# Patient Record
Sex: MALE | Race: WHITE
[De-identification: names, ages, dates, MRNs, and addresses within clinical notes are randomized per-mention and may not be internally consistent; named-entity substitution may affect disease eponyms.]

---

## 2017-03-08 NOTE — PHYS DOC
Past Medical History


Past Medical History:  Depression, Other


Additional Past Medical Histor:  SI


Past Surgical History:  No Surgical History


Alcohol Use:  Rarely


Drug Use:  Marijuana





Adult General


Chief Complaint


Chief Complaint:  TESTICULAR PAIN OR INJURY





HPI


HPI


27-year-old male presenting to the emergency department today after attempting 

castrated himself by placing a rubber band around the top part of his scrotal 

sac and testicular pedicle. He placed at approximately 2-3 hours prior to 

arrival. He did it because he wanted to stop the testosterone and wanted us to 

exchange. He has pain in his testicles that is sharp moderate to severe 

nonradiating and not alleviating factors.





Review of systems is negative for abdominal pain nausea vomiting diarrhea. All 

other review of systems is negative unless otherwise noted in history of 

present illness.





Review of Systems


Review of Systems


see above





Allergies


Allergies





 Allergies








Coded Allergies Type Severity Reaction Last Updated Verified


 


  No Known Drug Allergies    10/14/15 No











Physical Exam


Physical Exam








Constitutional: Well developed, well nourished, no acute distress, non-toxic 

appearance. 


HENT: Normocephalic, atraumatic, bilateral external ears normal, oropharynx 

moist, no oral exudates, nose normal. []


Eyes: PERRLA, EOMI, conjunctiva normal, no discharge. [] 


Neck: Normal range of motion, no tenderness, supple, no stridor.  


Cardiovascular:Heart rate regular rhythm, no murmur []


Lungs & Thorax:  Bilateral breath sounds clear to auscultation 


Abdomen: Bowel sounds normal, soft, no tenderness, no masses, no pulsatile 

masses. [] 


: The patient's testicles on initial evaluation are normal in size and mildly 

dusky in color. There is a tight green band at the most proximal aspect of the 

patient's scrotum. This was removed immediately upon the patient's arrival.


Skin: Warm, dry, no erythema, no rash. [] 


Back: No tenderness, no CVA tenderness.  


Extremities: No tenderness, no cyanosis, no clubbing, ROM intact, no edema. [] 


Neurologic: Alert and oriented X 3, normal motor function, normal sensory 

function, no focal deficits noted. 


Psychologic: 





Psych:


 Appearance:      normal


 M/S:         Alert and oriented


 Mood/Affect:      Normal


 Speech:      Normal


 Insight:      poor   


 Hallucinations:   None   


 SI or HI:      Patient denies suicidal or homicidal ideation however has 

demonstrated very significant self-harm attempt.





Current Patient Data


Vital Signs





 Vital Signs








  Date Time  Temp Pulse Resp B/P Pulse Ox O2 Delivery O2 Flow Rate FiO2


 


3/8/17 04:55  59  105/51 95 Room Air  


 


3/8/17 03:30 97.7  24     





 97.7       








Lab Values





 Laboratory Tests








Test


  3/8/17


03:34


 


White Blood Count


  14.9x10^3/uL


(4.0-11.0)  H


 


Red Blood Count


  4.76x10^6/uL


(4.30-5.70)


 


Hemoglobin


  13.9g/dL


(13.0-17.5)


 


Hematocrit


  42.2%


(39.0-53.0)


 


Mean Corpuscular Volume 89fL ()  


 


Mean Corpuscular Hemoglobin 29pg (25-35)  


 


Mean Corpuscular Hemoglobin


Concent 33g/dL (31-37)


 


 


Red Cell Distribution Width


  14.5%


(11.5-14.5)


 


Platelet Count


  250x10^3/uL


(140-400)


 


Neutrophils (%) (Auto) 71% (31-73)  


 


Lymphocytes (%) (Auto) 16% (24-48)  L


 


Monocytes (%) (Auto) 5% (0-9)  


 


Eosinophils (%) (Auto) 8% (0-3)  H


 


Basophils (%) (Auto) 1% (0-3)  


 


Neutrophils # (Auto)


  10.6x10^3uL


(1.8-7.7)  H


 


Lymphocytes # (Auto)


  2.3x10^3/uL


(1.0-4.8)


 


Monocytes # (Auto)


  0.7x10^3/uL


(0.0-1.1)


 


Eosinophils # (Auto)


  1.1x10^3/uL


(0.0-0.7)  H


 


Basophils # (Auto)


  0.1x10^3/uL


(0.0-0.2)


 


Sodium Level


  145mmol/L


(136-145)


 


Potassium Level


  3.2mmol/L


(3.5-5.1)  L


 


Chloride Level


  105mmol/L


()


 


Carbon Dioxide Level


  23mmol/L


(21-32)


 


Anion Gap 17 (6-14)  H


 


Blood Urea Nitrogen


  18mg/dL (8-26)


 


 


Creatinine


  1.2mg/dL


(0.7-1.3)


 


Estimated GFR


(Cockcroft-Gault) 72.6  


 


 


Glucose Level


  199mg/dL


(70-99)  H


 


Calcium Level


  8.9mg/dL


(8.5-10.1)





 Laboratory Tests


3/8/17 03:34








 Laboratory Tests


3/8/17 03:34














EKG


EKG


[]





Radiology/Procedures


Radiology/Procedures


[]





Course & Med Decision Making


Course & Med Decision Making


Pertinent Labs and Imaging studies reviewed. (See chart for details)





[][] 25-year-old male presenting to the emergency department after attempting 

castrate himself. Vital signs afebrile. Mild hypertension. Pertinent physical 

exam findings showed significant pressure at the most proximal aspect of the 

scrotal sac by green rubber band. This was cut immediately upon the patient's 

arrival. Immediately after this the patient's testicles color changed. The 

scrotal sac returned to normal color. CBC obtained which showed leukocytosis 

with chemistry panel otherwise showed mild hypokalemia. because of the patient'

s attempt of self-harm, our psychiatric assessment team evaluated the patient. 

Ultrasound of the testicles was obtained which showed blood flow to the 

testicles. Clinically the patient's testicles were not torsed. They were in 

normal orientation. Cremasteric reflex intact. Psychiatric team came up with a 

safety plan and close follow-up with sexual orientation support group in the 

city. The patient denied suicidal or homicidal ideation. Family comfortable 

with plan. Patient discharged home to follow up with support group. He was to 

return to the emergency department if he developed suicidal or homicidal 

ideation.





Dragon Disclaimer


Dragon Disclaimer


This electronic medical record was generated, in whole or in part, using a 

voice recognition dictation system.





Departure


Departure


Impression:  


 Primary Impression:  


 Testicular pain


 Additional Impression:  


 Self-harm


Disposition:  01 HOME, SELF-CARE


Condition:  STABLE


Referrals:  


NO PCP (PCP)





Additional Instructions:


Thank you for allowing us to participate in your care today.





Return to the emergency department if you develop homicidal or suicidal 

ideation (thoughts of hurting yourself or others).





Followup with your primary care physician in 3 days if your symptoms do not 

improve. If you do not have a primary care provider you can ask for a list of 

our primary care providers. Return to the emergency department you have any new 

or concerning findings.





This should be evaluated by the primary care physician and any necessary 

consulting services for continued management within a few days after discharge. 

Return to emergency room if you have any  new or concerning symptoms including 

but not limited to fever, chills, nausea, vomiting, intractable pain, any new 

rashes, chest pain, shortness of air, uncontrolled bleeding, difficulty 

breathing, and/or vision loss.





You may have been prescribed medication that can change in your level of 

thinking and ability to operate machinery. These medications include 

hydrocodone and Ativan. Also, Benadryl has been known to do this as well. Be 

sure to check with your pharmacist and ask if the medications you've prescribed 

can affect your level of consciousness. I recommend not operating heavy 

machinery or driving while on medication such as these.





Problem Qualifiers








GABINO BABB MD Mar 8, 2017 04:36

## 2017-03-08 NOTE — RAD
PROCEDURE 

Testicular ultrasound dated 03/08/2017. 

 

HISTORY 

Pain after injury. Patient wrapped band around testicles 

 

TECHNIQUE 

Routine sonographic imaging performed. 

 

COMPARISON 

 

 

FINDINGS 

Right testicle measures 5.3 x 3.0 x 2.3 centimeter. Left testicle measures

4.9 x 2.7 x 2.4 centimeter. Normal color Doppler flow to both testicles. 

There is a geographic zone of hypo echogenicity at the medial margin of 

the mid to inferior right testicle. 

Epididymis are unremarkable. No significant hydrocele or varicocele. No 

significant scrotal wall thickening. 

 

IMPRESSION 

Geographic zone of hypo echogenicity within the right testicle is 

indeterminate but given the clinical history may represent a zone of 

testicular infarction. Developing mass or inflammatory process considered 

less likely. Suggest follow up imaging in 2-3 months to ensure 

stability/resolution. 

 

Electronically signed by: Siva Morales (Mar 08, 2017 05:08:25)

## 2019-06-03 ENCOUNTER — HOSPITAL ENCOUNTER (EMERGENCY)
Dept: HOSPITAL 61 - ER | Age: 29
Discharge: HOME | End: 2019-06-03
Payer: SELF-PAY

## 2019-06-03 VITALS — WEIGHT: 160 LBS | BODY MASS INDEX: 21.67 KG/M2 | HEIGHT: 72 IN

## 2019-06-03 VITALS — DIASTOLIC BLOOD PRESSURE: 81 MMHG | SYSTOLIC BLOOD PRESSURE: 145 MMHG

## 2019-06-03 DIAGNOSIS — Z11.4: ICD-10-CM

## 2019-06-03 DIAGNOSIS — F41.9: ICD-10-CM

## 2019-06-03 DIAGNOSIS — R45.851: ICD-10-CM

## 2019-06-03 DIAGNOSIS — F12.20: Primary | ICD-10-CM

## 2019-06-03 DIAGNOSIS — F32.9: ICD-10-CM

## 2019-06-03 LAB
ALBUMIN SERPL-MCNC: 3.9 G/DL (ref 3.4–5)
ALP SERPL-CCNC: 70 U/L (ref 46–116)
ALT SERPL-CCNC: 26 U/L (ref 16–63)
AMPHETAMINE/METHAMPHETAMINE: (no result)
ANION GAP SERPL CALC-SCNC: 11 MMOL/L (ref 6–14)
AST SERPL-CCNC: 18 U/L (ref 15–37)
BARBITURATES UR-MCNC: (no result) UG/ML
BASOPHILS # BLD AUTO: 0 X10^3/UL (ref 0–0.2)
BASOPHILS NFR BLD: 1 % (ref 0–3)
BENZODIAZ UR-MCNC: (no result) UG/L
BILIRUB DIRECT SERPL-MCNC: 0.1 MG/DL (ref 0–0.2)
BILIRUB SERPL-MCNC: 0.3 MG/DL (ref 0.2–1)
BUN SERPL-MCNC: 13 MG/DL (ref 8–26)
CALCIUM SERPL-MCNC: 8.7 MG/DL (ref 8.5–10.1)
CANNABINOIDS UR-MCNC: (no result) UG/L
CHLORIDE SERPL-SCNC: 106 MMOL/L (ref 98–107)
CO2 SERPL-SCNC: 25 MMOL/L (ref 21–32)
COCAINE UR-MCNC: (no result) NG/ML
CREAT SERPL-MCNC: 0.9 MG/DL (ref 0.7–1.3)
EOSINOPHIL NFR BLD: 0.8 X10^3/UL (ref 0–0.7)
EOSINOPHIL NFR BLD: 12 % (ref 0–3)
ERYTHROCYTE [DISTWIDTH] IN BLOOD BY AUTOMATED COUNT: 15.4 % (ref 11.5–14.5)
GFR SERPLBLD BASED ON 1.73 SQ M-ARVRAT: 99.8 ML/MIN
GLUCOSE SERPL-MCNC: 90 MG/DL (ref 70–99)
HCT VFR BLD CALC: 46.4 % (ref 39–53)
HGB BLD-MCNC: 15.7 G/DL (ref 13–17.5)
LYMPHOCYTES # BLD: 1.3 X10^3/UL (ref 1–4.8)
LYMPHOCYTES NFR BLD AUTO: 20 % (ref 24–48)
MAGNESIUM SERPL-MCNC: 2 MG/DL (ref 1.8–2.4)
MCH RBC QN AUTO: 30 PG (ref 25–35)
MCHC RBC AUTO-ENTMCNC: 34 G/DL (ref 31–37)
MCV RBC AUTO: 90 FL (ref 79–100)
METHADONE SERPL-MCNC: (no result) NG/ML
MONO #: 0.5 X10^3/UL (ref 0–1.1)
MONOCYTES NFR BLD: 8 % (ref 0–9)
NEUT #: 3.8 X10^3UL (ref 1.8–7.7)
NEUTROPHILS NFR BLD AUTO: 59 % (ref 31–73)
OPIATES UR-MCNC: (no result) NG/ML
PCP SERPL-MCNC: (no result) MG/DL
PLATELET # BLD AUTO: 178 X10^3/UL (ref 140–400)
POTASSIUM SERPL-SCNC: 4.3 MMOL/L (ref 3.5–5.1)
PROT SERPL-MCNC: 7.1 G/DL (ref 6.4–8.2)
RBC # BLD AUTO: 5.16 X10^6/UL (ref 4.3–5.7)
SODIUM SERPL-SCNC: 142 MMOL/L (ref 136–145)
WBC # BLD AUTO: 6.4 X10^3/UL (ref 4–11)

## 2019-06-03 PROCEDURE — 86703 HIV-1/HIV-2 1 RESULT ANTBDY: CPT

## 2019-06-03 PROCEDURE — 80307 DRUG TEST PRSMV CHEM ANLYZR: CPT

## 2019-06-03 PROCEDURE — 85025 COMPLETE CBC W/AUTO DIFF WBC: CPT

## 2019-06-03 PROCEDURE — 80048 BASIC METABOLIC PNL TOTAL CA: CPT

## 2019-06-03 PROCEDURE — 99284 EMERGENCY DEPT VISIT MOD MDM: CPT

## 2019-06-03 PROCEDURE — 36415 COLL VENOUS BLD VENIPUNCTURE: CPT

## 2019-06-03 PROCEDURE — 83735 ASSAY OF MAGNESIUM: CPT

## 2019-06-03 PROCEDURE — 80076 HEPATIC FUNCTION PANEL: CPT

## 2019-06-03 NOTE — PHYS DOC
Past Medical History


Past Medical History:  Depression, HIV, Other


Additional Past Medical Histor:  SI


Past Surgical History:  No Surgical History


Alcohol Use:  None


Drug Use:  Marijuana





Adult General


Chief Complaint


Chief Complaint:  MEDICAL CLEARANCE





[facility]


HPI





Patient is a 29  year old male brought in by EMS with requesting work up and 

treatment for HIV. Patient rates he was diagnosed with HIV maybe 5 years ago in 

some clinic in [place] and does not take any medication because of lack of 

insurance and wants to take her of his head and wants treatment for his HIV. 

Patient denies any symptoms. When the triage nurse asked her about suicidal he 

states he had suicidal ideation all of his life but he is not going to kill 

himself right now. Patient told me that he has ideal to hang himself for his s

uicidal ideation and had suicidal attempt long time ago. Patient had mental 

hospitalization but he doesn't know how long ago it was happened. Patient denies

homicidal ideation and hallucination. Patient denies taking any medication. 

Patient stays with his mother. Patient admits to use marijuana and smoking 

cigarettes and denies using alcohol and other drugs.





Review of Systems


Review of Systems





Constitutional: Denies fever or chills []


Eyes: Denies change in visual acuity, redness, or eye pain []


HENT: Denies nasal congestion or sore throat []


Respiratory: Denies cough or shortness of breath []


Cardiovascular: No additional information not addressed in HPI []


GI: Denies abdominal pain, nausea, vomiting, bloody stools or diarrhea []


: Denies dysuria or hematuria []


Musculoskeletal: Denies back pain or joint pain []


Integument: Denies rash or skin lesions []


Neurologic: Denies headache, focal weakness or sensory changes []


Endocrine: Denies polyuria or polydipsia []





All other systems were reviewed and found to be within normal limits, except as 

documented in this note.





Allergies


Allergies





Allergies








Coded Allergies Type Severity Reaction Last Updated Verified


 


  No Known Drug Allergies    10/14/15 No











Physical Exam


Physical Exam





Constitutional: Well nourished, mild distress, non-toxic appearance. []


HENT: Normocephalic, atraumatic


Eyes: PERRLA, EOMI, conjunctiva normal, no discharge. [] 


Neck: Normal range of motion, no tenderness, supple, no stridor. [] 


Cardiovascular:Heart rate regular rhythm, no murmur []


Lungs & Thorax:  Bilateral breath sounds clear to auscultation []


Abdomen: Bowel sounds normal, soft, no tenderness, no masses, no pulsatile 

masses. [] 


Skin: Warm, dry, no erythema, no rash. [] 


Back: No tenderness, no CVA tenderness. [] 


Extremities: No tenderness, no cyanosis, no clubbing, ROM intact, no edema. [] 


Neurologic: Alert and oriented X 3, normal motor function, normal sensory 

function, no focal deficits noted. []


Psychologic: Affect anxious , suicidal thoughts.





Current Patient Data


Vital Signs





                                   Vital Signs








  Date Time  Temp Pulse Resp B/P (MAP) Pulse Ox O2 Delivery O2 Flow Rate FiO2


 


6/3/19 11:40  84 26 145/81 (102) 100 Room Air  


 


6/3/19 09:20 97.7       





 97.7       








Lab Values





                                Laboratory Tests








Test


 6/3/19


10:00 6/3/19


11:50


 


White Blood Count


 6.4 x10^3/uL


(4.0-11.0) 





 


Red Blood Count


 5.16 x10^6/uL


(4.30-5.70) 





 


Hemoglobin


 15.7 g/dL


(13.0-17.5) 





 


Hematocrit


 46.4 %


(39.0-53.0) 





 


Mean Corpuscular Volume


 90 fL ()


 





 


Mean Corpuscular Hemoglobin 30 pg (25-35)   


 


Mean Corpuscular Hemoglobin


Concent 34 g/dL


(31-37) 





 


Red Cell Distribution Width


 15.4 %


(11.5-14.5)  H 





 


Platelet Count


 178 x10^3/uL


(140-400) 





 


Neutrophils (%) (Auto) 59 % (31-73)   


 


Lymphocytes (%) (Auto) 20 % (24-48)  L 


 


Monocytes (%) (Auto) 8 % (0-9)   


 


Eosinophils (%) (Auto) 12 % (0-3)  H 


 


Basophils (%) (Auto) 1 % (0-3)   


 


Neutrophils # (Auto)


 3.8 x10^3uL


(1.8-7.7) 





 


Lymphocytes # (Auto)


 1.3 x10^3/uL


(1.0-4.8) 





 


Monocytes # (Auto)


 0.5 x10^3/uL


(0.0-1.1) 





 


Eosinophils # (Auto)


 0.8 x10^3/uL


(0.0-0.7)  H 





 


Basophils # (Auto)


 0.0 x10^3/uL


(0.0-0.2) 





 


Sodium Level


 142 mmol/L


(136-145) 





 


Potassium Level


 4.3 mmol/L


(3.5-5.1) 





 


Chloride Level


 106 mmol/L


() 





 


Carbon Dioxide Level


 25 mmol/L


(21-32) 





 


Anion Gap 11 (6-14)   


 


Blood Urea Nitrogen


 13 mg/dL


(8-26) 





 


Creatinine


 0.9 mg/dL


(0.7-1.3) 





 


Estimated GFR


(Cockcroft-Gault) 99.8  


 





 


Glucose Level


 90 mg/dL


(70-99) 





 


Calcium Level


 8.7 mg/dL


(8.5-10.1) 





 


Magnesium Level


 2.0 mg/dL


(1.8-2.4) 





 


Total Bilirubin


 0.3 mg/dL


(0.2-1.0) 





 


Direct Bilirubin


 0.1 mg/dL


(0.0-0.2) 





 


Aspartate Amino Transferase


(AST) 18 U/L (15-37)


 





 


Alanine Aminotransferase (ALT)


 26 U/L (16-63)


 





 


Alkaline Phosphatase


 70 U/L


() 





 


Total Protein


 7.1 g/dL


(6.4-8.2) 





 


Albumin


 3.9 g/dL


(3.4-5.0) 





 


Ethyl Alcohol Level


 < 10 mg/dL


(0-10) 





 


HIV (1&2) Antibody Screen


 Nonreactive


(Nonreactive) 





 


Urine Opiates Screen  Neg (NEG)  


 


Urine Methadone Screen  Neg (NEG)  


 


Urine Barbiturates  Neg (NEG)  


 


Urine Phencyclidine Screen  Neg (NEG)  


 


Urine


Amphetamine/Methamphetamine 


 Neg (NEG)  





 


Urine Benzodiazepines Screen  Neg (NEG)  


 


Urine Cocaine Screen  Neg (NEG)  


 


Urine Cannabinoids Screen  Pos (NEG)  


 


Urine Ethyl Alcohol  Neg (NEG)  





                                Laboratory Tests


6/3/19 10:00








                                Laboratory Tests


6/3/19 10:00














EKG


EKG


[]





Radiology/Procedures


Radiology/Procedures


[]





Course & Med Decision Making


Course & Med Decision Making


Pertinent Labs  reviewed. (See chart for details)





Evaluation of patient in ER showed 29-year-old male patient brought in by EMS 

for workup for HIV. Patient did not have confirmed diagnoses of HIV and after 

obtaining concent HIV test was requested. Patient had long-standing history of 

suicidal ideation without recent attempt. Patient was evaluated by [facility] 

team,L[facility] Dye and did not have criteria for inpatient admission. Patient had

 therapist and has appointment with his therapist for tomorrow. Plan to 

discharge patient home with instruction to follow up with the HIV results with 

his primary care physician or medical record in a few days and with his 

therapist tomorrow.





Dragon Disclaimer


Dragon Disclaimer


This electronic medical record was generated, in whole or in part, using a voice

 recognition dictation system.





Departure


Departure


Impression:  


   Primary Impression:  


   Anxiety about health


   Additional Impressions:  


   Substance abuse


   Verbalizes suicidal thoughts


   Encounter for human immunodeficiency virus test


Disposition:  01 HOME, SELF-CARE (at 1148)


Condition:  IMPROVED


Referrals:  


NO PCP (PCP)


Patient Instructions:  Suicidal Feelings, How to Help Yourself





Additional Instructions:  


Follow-up with your therapist appointment tomorrow


Follow-up with your primary care physician in 3-5 days HIV test results


Return to ER if not getting better





Problem Qualifiers











KURTIS ALVAREZ MD              Juan Carlos 3, 2019 10:24

## 2019-06-04 ENCOUNTER — HOSPITAL ENCOUNTER (EMERGENCY)
Dept: HOSPITAL 61 - ER | Age: 29
Discharge: HOME | End: 2019-06-04
Payer: SELF-PAY

## 2019-06-04 VITALS — BODY MASS INDEX: 21.67 KG/M2 | HEIGHT: 72 IN | WEIGHT: 160 LBS

## 2019-06-04 VITALS — SYSTOLIC BLOOD PRESSURE: 156 MMHG | DIASTOLIC BLOOD PRESSURE: 75 MMHG

## 2019-06-04 DIAGNOSIS — M25.511: Primary | ICD-10-CM

## 2019-06-04 DIAGNOSIS — F17.210: ICD-10-CM

## 2019-06-04 DIAGNOSIS — R68.2: ICD-10-CM

## 2019-06-04 DIAGNOSIS — F32.9: ICD-10-CM

## 2019-06-04 PROCEDURE — 73030 X-RAY EXAM OF SHOULDER: CPT

## 2019-06-04 PROCEDURE — 99284 EMERGENCY DEPT VISIT MOD MDM: CPT

## 2019-06-04 NOTE — PHYS DOC
Past Medical History


Past Medical History:  Depression, HIV, Other


Additional Past Medical Histor:  SI


Past Surgical History:  No Surgical History


Additional Information:  


SWISHERS/BLACK & MILDS: 1 A DAY


Alcohol Use:  None


Drug Use:  Marijuana





Adult General


Chief Complaint


Chief Complaint:  OTHER COMPLAINTS





[facility]





Patient is a 29  year old male who presents with a sore on his left side of 

mouth, and right shoulder pain that has been ongoing for years. A sore in the 

mouth has been going on for 2 weeks and getting worse. The patient thinks that 

he might possibly have HIV although has not been tested he thinks he's had this 

for approximately 5 years. His pain is 2 out of 10 and throbbing.





Review of Systems


Review of Systems





Constitutional: Denies fever or chills []


Eyes: Denies change in visual acuity, redness, or eye pain []


HENT: Denies nasal congestion or sore throat. Has sore on left side of his 

mouth.


Respiratory: Denies cough or shortness of breath []


Cardiovascular: No additional information not addressed in HPI []


GI: Denies abdominal pain, nausea, vomiting, bloody stools or diarrhea []


: Denies dysuria or hematuria []


Musculoskeletal: Denies back pain or joint pain with exception of R shoulder 

pain.


Integument: Denies rash or skin lesions []


Neurologic: Denies headache, focal weakness or sensory changes []


Endocrine: Denies polyuria or polydipsia []





Complete systems were reviewed and found to be within normal limits, except as 

documented in this note.





Allergies


Allergies





Allergies








Coded Allergies Type Severity Reaction Last Updated Verified


 


  No Known Drug Allergies    10/14/15 No











Physical Exam


Physical Exam





Constitutional: Well developed, well nourished, no acute distress, non-toxic 

appearance. []


HENT: Normocephalic, atraumatic, bilateral external ears normal, oropharynx 

moist, no oral exudates, nose normal. []


Eyes: PERRLA, EOMI, conjunctiva normal, no discharge. [] 


Neck: Normal range of motion, no tenderness, supple, no stridor. [] 


Cardiovascular:Heart rate regular rhythm, no murmur []


Lungs & Thorax:  Bilateral breath sounds clear to auscultation []


Abdomen: Bowel sounds normal, soft, no tenderness, no masses, no pulsatile 

masses. [] 


Skin: Warm, dry, no erythema, no rash. Sore to left side of mouth.


Back: No tenderness, no CVA tenderness. [] 


Extremities: No tenderness, no cyanosis, no clubbing, ROM intact, no edema. [] 


Neurologic: Alert and oriented X 3, normal motor function, normal sensory 

function, no focal deficits noted. []


Psychologic: Affect normal, judgement normal, mood normal. []





Current Patient Data


Vital Signs





                                   Vital Signs








  Date Time  Temp Pulse Resp B/P (MAP) Pulse Ox O2 Delivery O2 Flow Rate FiO2


 


19 16:58 98.3 77 14 156/75 (102) 98 Room Air  





 98.3       











EKG


EKG


[]





Radiology/Procedures


Radiology/Procedures


[]PATIENT: FROY COOK JACCOUNT: [id number]#: [id number]


: 1990           LOCATION: ER              AGE: 29


SEX: M                    EXAM DT: 19         ACCESSION#: [id number]


STATUS: REG ER            ORD. PHYSICIAN: MARANDA IZAGUIRRE   


REASON: pain


PROCEDURE: SHOULDER 2+V RIGHT





EXAM: Right shoulder, 3 views.


 


HISTORY: Pain.


 


COMPARISON: None.


 


FINDINGS: 3 views of the right shoulder obtained. There is no fracture, 


dislocation or subluxation.


 


IMPRESSION: No acute osseous finding.


 


Electronically signed by: Marietta Jesus MD (2019 5:56 PM) [facility]





Course & Med Decision Making


Course & Med Decision Making


Pertinent Labs and Imaging studies reviewed. (See chart for details)





Will get x-ray of shoulder. Will have patient follow up with the health 

department in the morning regarding HIV and the sore.





xray is negative. Will d/c to follow up with Health Department.





Dragon Disclaimer


Dragon Disclaimer


This electronic medical record was generated, in whole or in part, using a voice

 recognition dictation system.





Departure


Departure


Impression:  


   Primary Impression:  


   Mouth sore


   Additional Impression:  


   Shoulder pain


Disposition:  01 HOME, SELF-CARE


Condition:  STABLE


Referrals:  


NO PCP (PCP)





Additional Instructions:  


Please follow up with the Health Department tomorrow and obtain a primary care 

doctor. Come back to ER as needed.





Problem Qualifiers








   Additional Impression:  


   Shoulder pain


   Chronicity:  unspecified  Laterality:  right  Qualified Codes:  M25.511 - 

   Pain in right shoulder








MARANDA IZAGUIRRE           2019 17:28

## 2019-06-04 NOTE — RAD
EXAM: Right shoulder, 3 views.

 

HISTORY: Pain.

 

COMPARISON: None.

 

FINDINGS: 3 views of the right shoulder obtained. There is no fracture, 

dislocation or subluxation.

 

IMPRESSION: No acute osseous finding.

 

Electronically signed by: Marietta Jesus MD (6/4/2019 5:56 PM) [facility]